# Patient Record
Sex: MALE | Race: WHITE
[De-identification: names, ages, dates, MRNs, and addresses within clinical notes are randomized per-mention and may not be internally consistent; named-entity substitution may affect disease eponyms.]

---

## 2017-04-19 ENCOUNTER — APPOINTMENT (OUTPATIENT)
Dept: CARDIOLOGY | Facility: CLINIC | Age: 47
End: 2017-04-19

## 2017-04-19 ENCOUNTER — NON-APPOINTMENT (OUTPATIENT)
Age: 47
End: 2017-04-19

## 2017-04-19 VITALS
BODY MASS INDEX: 26.79 KG/M2 | HEART RATE: 66 BPM | RESPIRATION RATE: 14 BRPM | DIASTOLIC BLOOD PRESSURE: 76 MMHG | HEIGHT: 76 IN | SYSTOLIC BLOOD PRESSURE: 134 MMHG | WEIGHT: 220 LBS

## 2017-04-19 DIAGNOSIS — Z82.49 FAMILY HISTORY OF ISCHEMIC HEART DISEASE AND OTHER DISEASES OF THE CIRCULATORY SYSTEM: ICD-10-CM

## 2017-04-19 DIAGNOSIS — Z80.42 FAMILY HISTORY OF MALIGNANT NEOPLASM OF PROSTATE: ICD-10-CM

## 2017-04-19 DIAGNOSIS — Z83.3 FAMILY HISTORY OF DIABETES MELLITUS: ICD-10-CM

## 2017-04-19 DIAGNOSIS — F41.0 PANIC DISORDER [EPISODIC PAROXYSMAL ANXIETY]: ICD-10-CM

## 2017-04-19 DIAGNOSIS — Z78.9 OTHER SPECIFIED HEALTH STATUS: ICD-10-CM

## 2017-06-13 ENCOUNTER — APPOINTMENT (OUTPATIENT)
Dept: CARDIOLOGY | Facility: CLINIC | Age: 47
End: 2017-06-13

## 2020-11-13 ENCOUNTER — APPOINTMENT (OUTPATIENT)
Dept: CARDIOLOGY | Facility: CLINIC | Age: 50
End: 2020-11-13
Payer: OTHER GOVERNMENT

## 2020-11-13 ENCOUNTER — NON-APPOINTMENT (OUTPATIENT)
Age: 50
End: 2020-11-13

## 2020-11-13 VITALS
TEMPERATURE: 98.4 F | RESPIRATION RATE: 14 BRPM | BODY MASS INDEX: 27.87 KG/M2 | HEART RATE: 86 BPM | WEIGHT: 229 LBS | OXYGEN SATURATION: 100 % | DIASTOLIC BLOOD PRESSURE: 88 MMHG | SYSTOLIC BLOOD PRESSURE: 150 MMHG

## 2020-11-13 PROCEDURE — 93000 ELECTROCARDIOGRAM COMPLETE: CPT

## 2020-11-13 PROCEDURE — 99072 ADDL SUPL MATRL&STAF TM PHE: CPT

## 2020-11-13 PROCEDURE — 99244 OFF/OP CNSLTJ NEW/EST MOD 40: CPT

## 2020-11-13 NOTE — REASON FOR VISIT
[FreeTextEntry1] : \tran Efe Flores presents today for cardiac evaluation regarding palpitations.  He has a history of HBP.  He has been told of mitral prolapse, although this was seen on an echo done here in 2017.

## 2020-11-13 NOTE — PHYSICAL EXAM
[General Appearance - Well Developed] : well developed [Normal Appearance] : normal appearance [Well Groomed] : well groomed [General Appearance - Well Nourished] : well nourished [General Appearance - In No Acute Distress] : no acute distress [Normal Conjunctiva] : the conjunctiva exhibited no abnormalities [Eyelids - No Xanthelasma] : the eyelids demonstrated no xanthelasmas [Normal Jugular Venous A Waves Present] : normal jugular venous A waves present [Normal Jugular Venous V Waves Present] : normal jugular venous V waves present [No Jugular Venous Dick A Waves] : no jugular venous dick A waves [Respiration, Rhythm And Depth] : normal respiratory rhythm and effort [Auscultation Breath Sounds / Voice Sounds] : lungs were clear to auscultation bilaterally [Heart Rate And Rhythm] : heart rate and rhythm were normal [Bowel Sounds] : normal bowel sounds [Abnormal Walk] : normal gait [Gait - Sufficient For Exercise Testing] : the gait was sufficient for exercise testing [Nail Clubbing] : no clubbing of the fingernails [Cyanosis, Localized] : no localized cyanosis [Skin Color & Pigmentation] : normal skin color and pigmentation [] : no rash [No Venous Stasis] : no venous stasis [Oriented To Time, Place, And Person] : oriented to person, place, and time [Impaired Insight] : insight and judgment were intact [Affect] : the affect was normal [Mood] : the mood was normal [FreeTextEntry1] : 2+ pulses in the upper and lower extremities to the level of the feet. No edema.

## 2020-11-13 NOTE — HISTORY OF PRESENT ILLNESS
[FreeTextEntry1] : He is 50 years old.  He treated with losartan for HBP.  He was evaluated in the ED 12  ago for a racing heart and chest discomfort. A stress isotope study was done at that time, and was unremarkable.  \par \par I last saw him in 2017.  At that time an exercise echocardiogram was performed, which was unremarkable. As he returns today, he reports a sense of racing heart, occurring in predictably stressful or anxiety provoking situations.  He is particularly concerned about his elderly mother and her wellbeing.  When stressed or anxious, he will be aware of a strong and rapid heartbeat at times.  Otherwise, he feels well.  He continues all of his usual activities without limitation. He describes no episodes of exertional chest discomfort or dyspnea. He has not suffered any syncopal events.\par \par There is no history of diabetes or hyperlipidemia. There is no family history of premature heart disease. He has never been a cigarette smoker.

## 2020-11-13 NOTE — DISCUSSION/SUMMARY
[FreeTextEntry1] : \par I reassured Mr. Flores and his wife that his exam remains unremarkable today, with reasonable blood pressure.  EKG shows sinus rhythm and is unchanged compared to his electrocardiogram that was done here 3 years ago.  At that time, stress testing with echocardiographic imaging was unremarkable.\par \par I think his symptoms are likely due to sinus tachycardia in the setting of stress and anxiety.  At present he is essentially asymptomatic.  I explained that should he experience these symptoms again, we could arrange for an event monitor as needed to record his heart rhythm at the time of symptoms, to confirm this.\par \par He tells me that he will be seeing his internist within the next couple weeks for a comprehensive exam.  He anticipates that blood work will be checked at that time, so I did not send out any blood work today.  \par \par He will continue on his current dose of losartan and I sent in a new prescription for him to his local pharmacy.  \par \par We also discussed the appropriate precautions to take in the context of the current coronavirus pandemic.\par \par He will return here on an as needed basis.

## 2021-02-23 ENCOUNTER — APPOINTMENT (OUTPATIENT)
Dept: INTERNAL MEDICINE | Facility: CLINIC | Age: 51
End: 2021-02-23
Payer: OTHER GOVERNMENT

## 2021-02-23 VITALS
BODY MASS INDEX: 28.35 KG/M2 | HEIGHT: 75 IN | TEMPERATURE: 97.34 F | WEIGHT: 228 LBS | OXYGEN SATURATION: 100 % | DIASTOLIC BLOOD PRESSURE: 74 MMHG | SYSTOLIC BLOOD PRESSURE: 130 MMHG | HEART RATE: 99 BPM

## 2021-02-23 VITALS — DIASTOLIC BLOOD PRESSURE: 76 MMHG | SYSTOLIC BLOOD PRESSURE: 128 MMHG

## 2021-02-23 DIAGNOSIS — N52.9 MALE ERECTILE DYSFUNCTION, UNSPECIFIED: ICD-10-CM

## 2021-02-23 DIAGNOSIS — Z11.59 ENCOUNTER FOR SCREENING FOR OTHER VIRAL DISEASES: ICD-10-CM

## 2021-02-23 DIAGNOSIS — Z12.5 ENCOUNTER FOR SCREENING FOR MALIGNANT NEOPLASM OF PROSTATE: ICD-10-CM

## 2021-02-23 PROCEDURE — 36415 COLL VENOUS BLD VENIPUNCTURE: CPT

## 2021-02-23 PROCEDURE — 93000 ELECTROCARDIOGRAM COMPLETE: CPT | Mod: 59

## 2021-02-23 PROCEDURE — 99386 PREV VISIT NEW AGE 40-64: CPT | Mod: 25

## 2021-02-23 PROCEDURE — G0444 DEPRESSION SCREEN ANNUAL: CPT

## 2021-02-23 PROCEDURE — 99072 ADDL SUPL MATRL&STAF TM PHE: CPT

## 2021-02-23 NOTE — PHYSICAL EXAM
[No Acute Distress] : no acute distress [Well Nourished] : well nourished [Well Developed] : well developed [Well-Appearing] : well-appearing [Normal Voice/Communication] : normal voice/communication [Normal Sclera/Conjunctiva] : normal sclera/conjunctiva [PERRL] : pupils equal round and reactive to light [Normal Outer Ear/Nose] : the outer ears and nose were normal in appearance [Normal Oropharynx] : the oropharynx was normal [No JVD] : no jugular venous distention [No Lymphadenopathy] : no lymphadenopathy [Supple] : supple [Thyroid Normal, No Nodules] : the thyroid was normal and there were no nodules present [No Respiratory Distress] : no respiratory distress  [No Accessory Muscle Use] : no accessory muscle use [Clear to Auscultation] : lungs were clear to auscultation bilaterally [Normal Rate] : normal rate  [Regular Rhythm] : with a regular rhythm [Normal S1, S2] : normal S1 and S2 [No Murmur] : no murmur heard [No Carotid Bruits] : no carotid bruits [No Abdominal Bruit] : a ~M bruit was not heard ~T in the abdomen [No Varicosities] : no varicosities [Pedal Pulses Present] : the pedal pulses are present [No Edema] : there was no peripheral edema [No Palpable Aorta] : no palpable aorta [No Extremity Clubbing/Cyanosis] : no extremity clubbing/cyanosis [Non Tender] : non-tender [Soft] : abdomen soft [Non-distended] : non-distended [No Masses] : no abdominal mass palpated [No HSM] : no HSM [Normal Bowel Sounds] : normal bowel sounds [Normal Posterior Cervical Nodes] : no posterior cervical lymphadenopathy [Normal Anterior Cervical Nodes] : no anterior cervical lymphadenopathy [No Spinal Tenderness] : no spinal tenderness [No Joint Swelling] : no joint swelling [Grossly Normal Strength/Tone] : grossly normal strength/tone [No Rash] : no rash [Coordination Grossly Intact] : coordination grossly intact [Normal Gait] : normal gait [No Focal Deficits] : no focal deficits [Normal Affect] : the affect was normal [Normal Insight/Judgement] : insight and judgment were intact [Normal TMs] : both tympanic membranes were normal [Normal Sphincter Tone] : normal sphincter tone [No Mass] : no mass [Prostate Enlargement] : the prostate was not enlarged [Prostate Tenderness] : the prostate was not tender [No Prostate Nodules] : no prostate nodules [Normal Supraclavicular Nodes] : no supraclavicular lymphadenopathy [Speech Grossly Normal] : speech grossly normal [Alert and Oriented x3] : oriented to person, place, and time [Normal Mood] : the mood was normal [FreeTextEntry1] : external hemorrhoids

## 2021-02-23 NOTE — REVIEW OF SYSTEMS
[Patient Intake Form Reviewed] : Patient intake form was reviewed [Negative] : Heme/Lymph [Dysuria] : no dysuria [Hesitancy] : no hesitancy [Nocturia] : no nocturia [Hematuria] : no hematuria [Frequency] : no frequency

## 2021-02-23 NOTE — HISTORY OF PRESENT ILLNESS
[de-identified] : 49 y/o man presents for initial visit to establish primary care w new internist. following previously w Dr Marian Alegria for primary care, had yearly checks. he feels well overall currently. has mild anxiety, stress at times. \par \par hx of moderate HTN , controlled on losartan, compliant w rx. saw Dr Lambert for CV evaluation last year, hx of mild MVP, asymptomatic. \par \par he would like to discuss ED symptoms and consider rx \par \par he had screening colonoscopy at age 48 w Dr Fuller [facility] at [facility]. pt recalls normal results

## 2021-02-23 NOTE — ASSESSMENT
[FreeTextEntry1] : discussed w pt \par \par check routine labs as below \par \par HTN well controlled on current rx, cont as prescribed\par advised on diet control, low Na intake, exercise, weight loss \par \par repeat BP improved \par reviewed cardiology evaluation, past stress testing w Dr Lambert \par \par he had influenza vaccine this season \par colonoscopy screening UTD \par \par discussed ED symptoms. no contraindications to trying sildenafil 50 mg prn to start, f/u if any concerns \par \par RTO 6 months for routine f/u or earlier prn if any new concerns

## 2021-02-23 NOTE — HEALTH RISK ASSESSMENT
[No] : In the past 12 months have you used drugs other than those required for medical reasons? No [Patient reported colonoscopy was normal] : Patient reported colonoscopy was normal [None] : None [Employed] : employed [] :  [] : No [ColonoscopyDate] : 12/19

## 2021-03-05 LAB
ALBUMIN SERPL ELPH-MCNC: 5.1 G/DL
ALP BLD-CCNC: 59 U/L
ALT SERPL-CCNC: 31 U/L
ANION GAP SERPL CALC-SCNC: 14 MMOL/L
APPEARANCE: CLEAR
AST SERPL-CCNC: 23 U/L
BASOPHILS # BLD AUTO: 0.04 K/UL
BASOPHILS NFR BLD AUTO: 0.9 %
BILIRUB SERPL-MCNC: 0.8 MG/DL
BILIRUBIN URINE: NEGATIVE
BLOOD URINE: NEGATIVE
BUN SERPL-MCNC: 13 MG/DL
CALCIUM SERPL-MCNC: 10.1 MG/DL
CHLORIDE SERPL-SCNC: 102 MMOL/L
CHOLEST SERPL-MCNC: 214 MG/DL
CO2 SERPL-SCNC: 24 MMOL/L
COLOR: YELLOW
CREAT SERPL-MCNC: 0.92 MG/DL
EOSINOPHIL # BLD AUTO: 0.06 K/UL
EOSINOPHIL NFR BLD AUTO: 1.4 %
ESTIMATED AVERAGE GLUCOSE: 105 MG/DL
GLUCOSE QUALITATIVE U: NEGATIVE
GLUCOSE SERPL-MCNC: 113 MG/DL
HBA1C MFR BLD HPLC: 5.3 %
HCT VFR BLD CALC: 44.5 %
HDLC SERPL-MCNC: 44 MG/DL
HGB BLD-MCNC: 15.8 G/DL
IMM GRANULOCYTES NFR BLD AUTO: 0.5 %
KETONES URINE: NEGATIVE
LDLC SERPL CALC-MCNC: 135 MG/DL
LEUKOCYTE ESTERASE URINE: NEGATIVE
LYMPHOCYTES # BLD AUTO: 1.52 K/UL
LYMPHOCYTES NFR BLD AUTO: 34.5 %
MAN DIFF?: NORMAL
MCHC RBC-ENTMCNC: 31.9 PG
MCHC RBC-ENTMCNC: 35.5 GM/DL
MCV RBC AUTO: 89.7 FL
MONOCYTES # BLD AUTO: 0.45 K/UL
MONOCYTES NFR BLD AUTO: 10.2 %
NEUTROPHILS # BLD AUTO: 2.31 K/UL
NEUTROPHILS NFR BLD AUTO: 52.5 %
NITRITE URINE: NEGATIVE
NONHDLC SERPL-MCNC: 170 MG/DL
PH URINE: 6
PLATELET # BLD AUTO: 191 K/UL
POTASSIUM SERPL-SCNC: 4.6 MMOL/L
PROT SERPL-MCNC: 7 G/DL
PROTEIN URINE: NORMAL
PSA SERPL-MCNC: 0.86 NG/ML
RBC # BLD: 4.96 M/UL
RBC # FLD: 12.3 %
SARS-COV-2 IGG SERPL IA-ACNC: 0.07 INDEX
SARS-COV-2 IGG SERPL QL IA: NEGATIVE
SODIUM SERPL-SCNC: 140 MMOL/L
SPECIFIC GRAVITY URINE: 1.02
T4 FREE SERPL-MCNC: 1.1 NG/DL
TRIGL SERPL-MCNC: 178 MG/DL
TSH SERPL-ACNC: 2.21 UIU/ML
UROBILINOGEN URINE: NORMAL
WBC # FLD AUTO: 4.4 K/UL

## 2021-06-22 ENCOUNTER — NON-APPOINTMENT (OUTPATIENT)
Age: 51
End: 2021-06-22

## 2021-06-22 ENCOUNTER — APPOINTMENT (OUTPATIENT)
Dept: INTERNAL MEDICINE | Facility: CLINIC | Age: 51
End: 2021-06-22
Payer: OTHER GOVERNMENT

## 2021-06-22 VITALS
DIASTOLIC BLOOD PRESSURE: 74 MMHG | HEIGHT: 75 IN | HEART RATE: 84 BPM | TEMPERATURE: 97.7 F | SYSTOLIC BLOOD PRESSURE: 140 MMHG | WEIGHT: 230 LBS | BODY MASS INDEX: 28.6 KG/M2 | OXYGEN SATURATION: 99 %

## 2021-06-22 VITALS — SYSTOLIC BLOOD PRESSURE: 130 MMHG | DIASTOLIC BLOOD PRESSURE: 80 MMHG

## 2021-06-22 DIAGNOSIS — R20.0 ANESTHESIA OF SKIN: ICD-10-CM

## 2021-06-22 DIAGNOSIS — M54.2 CERVICALGIA: ICD-10-CM

## 2021-06-22 DIAGNOSIS — R09.81 NASAL CONGESTION: ICD-10-CM

## 2021-06-22 DIAGNOSIS — R42 DIZZINESS AND GIDDINESS: ICD-10-CM

## 2021-06-22 PROCEDURE — 99214 OFFICE O/P EST MOD 30 MIN: CPT

## 2021-06-22 PROCEDURE — 99072 ADDL SUPL MATRL&STAF TM PHE: CPT

## 2021-06-22 RX ORDER — CETIRIZINE HYDROCHLORIDE 10 MG/1
10 CAPSULE, LIQUID FILLED ORAL
Qty: 90 | Refills: 3 | Status: ACTIVE | COMMUNITY
Start: 2021-06-22

## 2021-06-22 RX ORDER — SILDENAFIL 100 MG/1
100 TABLET, FILM COATED ORAL
Qty: 10 | Refills: 1 | Status: ACTIVE | COMMUNITY
Start: 2021-02-23 | End: 1900-01-01

## 2021-06-22 NOTE — HISTORY OF PRESENT ILLNESS
[FreeTextEntry8] : Mr. MIGDALIA PEOPLES is a 50 year old man with pmhx of hypertension, hx of mild MVP who presents for an acute appointment. He endorses yearly seasonal allergies. He had nasal congestion, clear secretions, and pressure in the top of his head. He has been taking tylenol and aspirin with relief of pressure. He also endorses neck pain on right posterior neck. \par \par \par He endorses a one time episode of left forearm numbness and tingling. Lasted 30 minutes. \par

## 2021-06-22 NOTE — PHYSICAL EXAM
[No Acute Distress] : no acute distress [Well Nourished] : well nourished [Normal Sclera/Conjunctiva] : normal sclera/conjunctiva [No Respiratory Distress] : no respiratory distress  [No Accessory Muscle Use] : no accessory muscle use [Pedal Pulses Present] : the pedal pulses are present [No Edema] : there was no peripheral edema [No Extremity Clubbing/Cyanosis] : no extremity clubbing/cyanosis [Soft] : abdomen soft [Non Tender] : non-tender [Normal Bowel Sounds] : normal bowel sounds

## 2021-06-28 ENCOUNTER — APPOINTMENT (OUTPATIENT)
Dept: ULTRASOUND IMAGING | Facility: CLINIC | Age: 51
End: 2021-06-28

## 2021-07-02 ENCOUNTER — APPOINTMENT (OUTPATIENT)
Dept: CARDIOLOGY | Facility: CLINIC | Age: 51
End: 2021-07-02
Payer: OTHER GOVERNMENT

## 2021-07-02 ENCOUNTER — NON-APPOINTMENT (OUTPATIENT)
Age: 51
End: 2021-07-02

## 2021-07-02 VITALS
WEIGHT: 230 LBS | OXYGEN SATURATION: 99 % | BODY MASS INDEX: 28.75 KG/M2 | HEART RATE: 90 BPM | DIASTOLIC BLOOD PRESSURE: 85 MMHG | SYSTOLIC BLOOD PRESSURE: 130 MMHG | RESPIRATION RATE: 17 BRPM

## 2021-07-02 VITALS — SYSTOLIC BLOOD PRESSURE: 114 MMHG | DIASTOLIC BLOOD PRESSURE: 68 MMHG

## 2021-07-02 DIAGNOSIS — F41.9 ANXIETY DISORDER, UNSPECIFIED: ICD-10-CM

## 2021-07-02 PROCEDURE — 99072 ADDL SUPL MATRL&STAF TM PHE: CPT

## 2021-07-02 PROCEDURE — 99215 OFFICE O/P EST HI 40 MIN: CPT

## 2021-07-02 PROCEDURE — 93000 ELECTROCARDIOGRAM COMPLETE: CPT

## 2021-07-02 NOTE — DISCUSSION/SUMMARY
[FreeTextEntry1] : \par I reassured Mr. Flores and his wife that his exam remains unremarkable today.  BP is in the lower range of normal, and given his episodic lightheadedness, I am inclined to reduce the dose of losartan to 50 mg. qd.\par \par Will provide an Rx for alprazolam to use prn in situations of stress/anxiety.  \par \par Lipid profile is higher than ideal; will next O.V., will arrange carotid Doppler to assess for presence of plaque, to help decide if he should be on a lipid lowering med.\par \par He will return in 2 mos. for and O.V. and carotid Doppler.

## 2021-07-02 NOTE — HISTORY OF PRESENT ILLNESS
[FreeTextEntry1] : He treated with losartan for HBP.  He was evaluated in the ED 1>10 yrs. ago for a racing heart and chest discomfort. A stress isotope study was done at that time, and was unremarkable.  \par \par I last saw him in November.  As he returns today, he reports occasional lightheadedness, and is concerned that this could be related to his BP.  In the early part of the day, BP is typically 120-130 systolic, but in the evening has been as low as 95 systolic.\par \par As he returns today, he is occasionally aware of a fast/string heartbeat, typically in stressful situations. He is able to continues his usual activities and reports no exertional chest discomfort or dyspnea. He has not suffered any syncopal events.\par

## 2021-07-05 LAB
CHOLEST SERPL-MCNC: 179 MG/DL
CK SERPL-CCNC: 157 U/L
HDLC SERPL-MCNC: 37 MG/DL
LDLC SERPL CALC-MCNC: 93 MG/DL
LDLC SERPL DIRECT ASSAY-MCNC: 103 MG/DL
NONHDLC SERPL-MCNC: 142 MG/DL
TRIGL SERPL-MCNC: 249 MG/DL

## 2021-09-03 ENCOUNTER — APPOINTMENT (OUTPATIENT)
Dept: CARDIOLOGY | Facility: CLINIC | Age: 51
End: 2021-09-03
Payer: OTHER GOVERNMENT

## 2021-09-03 ENCOUNTER — NON-APPOINTMENT (OUTPATIENT)
Age: 51
End: 2021-09-03

## 2021-09-03 VITALS
RESPIRATION RATE: 15 BRPM | HEIGHT: 75 IN | BODY MASS INDEX: 29.22 KG/M2 | SYSTOLIC BLOOD PRESSURE: 118 MMHG | WEIGHT: 235 LBS | OXYGEN SATURATION: 95 % | DIASTOLIC BLOOD PRESSURE: 78 MMHG | HEART RATE: 79 BPM

## 2021-09-03 PROCEDURE — 99072 ADDL SUPL MATRL&STAF TM PHE: CPT

## 2021-09-03 PROCEDURE — 93880 EXTRACRANIAL BILAT STUDY: CPT

## 2021-09-03 PROCEDURE — 99214 OFFICE O/P EST MOD 30 MIN: CPT

## 2021-09-03 PROCEDURE — 93000 ELECTROCARDIOGRAM COMPLETE: CPT

## 2021-09-03 NOTE — DISCUSSION/SUMMARY
[FreeTextEntry1] : \par I reassured Mr. Flores that his exam & EKG remain stable today; BP is in normal range.  He will continue losartan at 50 mg. qd.\par \par Reviewed carotid Doppler.  As study is unremarkable, see no reason to add med for lipid control.  He will continue a low fat diet and try to lose weight. \par \par He will return in 6 mos. for a f/u visit.

## 2021-09-03 NOTE — HISTORY OF PRESENT ILLNESS
[FreeTextEntry1] : He treated with losartan for HBP.  He was evaluated in the ED in the past for a racing heart and chest discomfort. A stress isotope study was done at that time, and was unremarkable.  \par \par I last saw him in July.  At that time, he reported occasional lightheadedness, and was concerned that this could be related to his BP.  The dose of losartan was reduced as BP control appeared to be too strict.\par \par As he returns today, he has been feeling well.  He reports no recent palps.  He continues his usual activities and there have been no episodes of exertional chest discomfort or TAFOYA. He reports no lightheadedness or episodes orthopnea or PND.\par \par There have been no new interval medical problems.

## 2021-12-30 ENCOUNTER — RX RENEWAL (OUTPATIENT)
Age: 51
End: 2021-12-30

## 2022-03-04 ENCOUNTER — APPOINTMENT (OUTPATIENT)
Dept: CARDIOLOGY | Facility: CLINIC | Age: 52
End: 2022-03-04

## 2022-03-04 NOTE — PHYSICAL EXAM
[General Appearance - Well Developed] : well developed [Well Groomed] : well groomed [Normal Appearance] : normal appearance [General Appearance - Well Nourished] : well nourished [General Appearance - In No Acute Distress] : no acute distress [Normal Conjunctiva] : the conjunctiva exhibited no abnormalities [Eyelids - No Xanthelasma] : the eyelids demonstrated no xanthelasmas [Normal Jugular Venous A Waves Present] : normal jugular venous A waves present [Normal Jugular Venous V Waves Present] : normal jugular venous V waves present [No Jugular Venous Dick A Waves] : no jugular venous dick A waves [Respiration, Rhythm And Depth] : normal respiratory rhythm and effort [Heart Rate And Rhythm] : heart rate and rhythm were normal [Auscultation Breath Sounds / Voice Sounds] : lungs were clear to auscultation bilaterally [Bowel Sounds] : normal bowel sounds [Abnormal Walk] : normal gait [Gait - Sufficient For Exercise Testing] : the gait was sufficient for exercise testing [Nail Clubbing] : no clubbing of the fingernails [Cyanosis, Localized] : no localized cyanosis [Skin Color & Pigmentation] : normal skin color and pigmentation [] : no rash [No Venous Stasis] : no venous stasis [Impaired Insight] : insight and judgment were intact [Oriented To Time, Place, And Person] : oriented to person, place, and time [Affect] : the affect was normal [Mood] : the mood was normal [FreeTextEntry1] : 2+ pulses in the upper and lower extremities to the level of the feet. No edema.

## 2022-03-04 NOTE — DISCUSSION/SUMMARY
[FreeTextEntry1] : I reassured Mr. Flores that his exam & EKG remain stable today; BP is in normal range.  He will continue losartan at 50 mg. qd.\par \par Reviewed carotid Doppler.  As study is unremarkable, see no reason to add med for lipid control.  He will continue a low fat diet and try to lose weight. \par \par minutes spent on today's office visit. \par \par He will return in 6 mos. for a f/u visit.

## 2022-03-04 NOTE — REASON FOR VISIT
[FreeTextEntry1] : NO SHOW FOR APPOINTMENT TODAY.\par \par PRELIMINARY NOTE\par \par Efe Flores returns for f/u re palpitations and HBP.

## 2022-04-11 PROBLEM — Z11.59 SCREENING FOR VIRAL DISEASE: Status: ACTIVE | Noted: 2021-02-23

## 2022-06-10 ENCOUNTER — APPOINTMENT (OUTPATIENT)
Dept: CARDIOLOGY | Facility: CLINIC | Age: 52
End: 2022-06-10
Payer: OTHER GOVERNMENT

## 2022-06-10 ENCOUNTER — NON-APPOINTMENT (OUTPATIENT)
Age: 52
End: 2022-06-10

## 2022-06-10 VITALS — SYSTOLIC BLOOD PRESSURE: 118 MMHG | DIASTOLIC BLOOD PRESSURE: 74 MMHG

## 2022-06-10 VITALS
OXYGEN SATURATION: 95 % | HEART RATE: 86 BPM | HEIGHT: 75 IN | SYSTOLIC BLOOD PRESSURE: 124 MMHG | WEIGHT: 234 LBS | BODY MASS INDEX: 29.09 KG/M2 | DIASTOLIC BLOOD PRESSURE: 80 MMHG

## 2022-06-10 PROCEDURE — 99214 OFFICE O/P EST MOD 30 MIN: CPT

## 2022-06-10 PROCEDURE — 99072 ADDL SUPL MATRL&STAF TM PHE: CPT

## 2022-06-10 PROCEDURE — 93000 ELECTROCARDIOGRAM COMPLETE: CPT

## 2022-06-10 NOTE — HISTORY OF PRESENT ILLNESS
[FreeTextEntry1] : He treated with losartan for HBP.  He was evaluated in the ED in the past for a racing heart and chest discomfort. A stress isotope study was done at that time, and was unremarkable.  \par \par Since I last saw him, he has been well.  He continues his usual work and activities.  He describes no recurrence of palps or lightheadedness.  There have been no episodes of exertional chest discomfort or TAFOYA. He reports no episodes of orthopnea or PND.\par \par There have been no new interval medical problems.

## 2022-06-10 NOTE — DISCUSSION/SUMMARY
[FreeTextEntry1] : I reassured Mr. Flores that his exam & EKG remain stable today.  BP remains in normal range on the lesser dose of losartan; he will continue 50 mg. qd.\par \par Mild HLD - Continue a low fat diet and efforts to lose weight. \par \par 30 minutes spent on today's office visit. \par \par He will return in 6 mos. for a f/u visit.

## 2022-06-13 LAB
ALBUMIN SERPL ELPH-MCNC: 5.1 G/DL
ALP BLD-CCNC: 66 U/L
ALT SERPL-CCNC: 29 U/L
ANION GAP SERPL CALC-SCNC: 14 MMOL/L
AST SERPL-CCNC: 21 U/L
BASOPHILS # BLD AUTO: 0.06 K/UL
BASOPHILS NFR BLD AUTO: 0.9 %
BILIRUB SERPL-MCNC: 0.6 MG/DL
BUN SERPL-MCNC: 11 MG/DL
CALCIUM SERPL-MCNC: 9.8 MG/DL
CHLORIDE SERPL-SCNC: 101 MMOL/L
CHOLEST SERPL-MCNC: 204 MG/DL
CO2 SERPL-SCNC: 25 MMOL/L
CREAT SERPL-MCNC: 0.81 MG/DL
EGFR: 105 ML/MIN/1.73M2
EOSINOPHIL # BLD AUTO: 0.11 K/UL
EOSINOPHIL NFR BLD AUTO: 1.6 %
ESTIMATED AVERAGE GLUCOSE: 108 MG/DL
GLUCOSE SERPL-MCNC: 102 MG/DL
HBA1C MFR BLD HPLC: 5.4 %
HCT VFR BLD CALC: 43.9 %
HDLC SERPL-MCNC: 41 MG/DL
HGB BLD-MCNC: 16 G/DL
IMM GRANULOCYTES NFR BLD AUTO: 0.3 %
LDLC SERPL CALC-MCNC: 110 MG/DL
LDLC SERPL DIRECT ASSAY-MCNC: 125 MG/DL
LYMPHOCYTES # BLD AUTO: 1.43 K/UL
LYMPHOCYTES NFR BLD AUTO: 21.4 %
MAN DIFF?: NORMAL
MCHC RBC-ENTMCNC: 32.5 PG
MCHC RBC-ENTMCNC: 36.4 GM/DL
MCV RBC AUTO: 89 FL
MONOCYTES # BLD AUTO: 0.59 K/UL
MONOCYTES NFR BLD AUTO: 8.8 %
NEUTROPHILS # BLD AUTO: 4.48 K/UL
NEUTROPHILS NFR BLD AUTO: 67 %
NONHDLC SERPL-MCNC: 164 MG/DL
PLATELET # BLD AUTO: 191 K/UL
POTASSIUM SERPL-SCNC: 4.2 MMOL/L
PROT SERPL-MCNC: 7.2 G/DL
RBC # BLD: 4.93 M/UL
RBC # FLD: 12.4 %
SODIUM SERPL-SCNC: 141 MMOL/L
TRIGL SERPL-MCNC: 270 MG/DL
WBC # FLD AUTO: 6.69 K/UL

## 2022-07-09 ENCOUNTER — TRANSCRIPTION ENCOUNTER (OUTPATIENT)
Age: 52
End: 2022-07-09

## 2022-07-09 ENCOUNTER — NON-APPOINTMENT (OUTPATIENT)
Age: 52
End: 2022-07-09

## 2022-09-14 ENCOUNTER — APPOINTMENT (OUTPATIENT)
Dept: CARDIOLOGY | Facility: CLINIC | Age: 52
End: 2022-09-14

## 2022-09-14 ENCOUNTER — NON-APPOINTMENT (OUTPATIENT)
Age: 52
End: 2022-09-14

## 2022-09-14 VITALS
OXYGEN SATURATION: 97 % | BODY MASS INDEX: 28.12 KG/M2 | SYSTOLIC BLOOD PRESSURE: 118 MMHG | WEIGHT: 225 LBS | DIASTOLIC BLOOD PRESSURE: 82 MMHG | HEART RATE: 90 BPM

## 2022-09-14 PROCEDURE — 99072 ADDL SUPL MATRL&STAF TM PHE: CPT

## 2022-09-14 PROCEDURE — 93000 ELECTROCARDIOGRAM COMPLETE: CPT

## 2022-09-14 PROCEDURE — 99215 OFFICE O/P EST HI 40 MIN: CPT

## 2022-09-14 NOTE — HISTORY OF PRESENT ILLNESS
[FreeTextEntry1] : He is treated with losartan for HBP.  He was evaluated in the ED in the past for a racing heart and chest discomfort. A stress isotope study was done at that time, and was unremarkable.  \par \par As he returns today, he describes increased stress of late.  His mother was recently hospitalized for an acute myocardial infarction and required placements of stents into arteries.  He also with, with his wife, is in the process of selling his mother-in-law's home, which has been a complex process.  In this context, he acknowledges degree of anxiety.  Over the past week or 2, he has been more aware of his heart rate, with the periodic sense of palpitations, but no exertional chest discomfort or dyspnea.  He is able to continue his usual activities without limitation.  He describes occasional mild lightheadedness but no syncopal events.  There have been no episodes of orthopnea or PND.\par

## 2022-09-14 NOTE — DISCUSSION/SUMMARY
[EKG obtained to assist in diagnosis and management of assessed problem(s)] : EKG obtained to assist in diagnosis and management of assessed problem(s) [FreeTextEntry1] : Palpitations  - I reassured Mr. Flores that his EKG is within normal limits today.  His physical exam is also unremarkable, with normal heart sounds and blood pressure in good range.  I suspect some of the symptoms are related to anxiety.  I will have him wear a ZIO XT monitor for 1 week, to assess his ambulatory rhythm.  We will arrange for an echocardiogram at his next office visit to assess underlying cardiac structure and function.\par \par HBP -remains in good range today.  He will continue losartan 50 mg. qd.\par \par Mild HLD - Continue a low fat diet and efforts at weight loss. \par \par 40 minutes spent on today's office visit. \par \par He will return in 3-4 weeks for a f/u visit with TTE.

## 2022-09-30 ENCOUNTER — NON-APPOINTMENT (OUTPATIENT)
Age: 52
End: 2022-09-30

## 2022-09-30 ENCOUNTER — APPOINTMENT (OUTPATIENT)
Dept: CARDIOLOGY | Facility: CLINIC | Age: 52
End: 2022-09-30

## 2022-09-30 VITALS
BODY MASS INDEX: 27.98 KG/M2 | SYSTOLIC BLOOD PRESSURE: 156 MMHG | RESPIRATION RATE: 15 BRPM | HEART RATE: 106 BPM | WEIGHT: 225 LBS | HEIGHT: 75 IN | DIASTOLIC BLOOD PRESSURE: 83 MMHG | OXYGEN SATURATION: 97 %

## 2022-09-30 PROCEDURE — 99072 ADDL SUPL MATRL&STAF TM PHE: CPT

## 2022-09-30 PROCEDURE — 93000 ELECTROCARDIOGRAM COMPLETE: CPT | Mod: 59

## 2022-09-30 PROCEDURE — 99214 OFFICE O/P EST MOD 30 MIN: CPT

## 2022-09-30 RX ORDER — ALPRAZOLAM 0.25 MG/1
0.25 TABLET ORAL
Qty: 40 | Refills: 0 | Status: ACTIVE | COMMUNITY
Start: 2021-07-02 | End: 1900-01-01

## 2022-09-30 NOTE — DISCUSSION/SUMMARY
[FreeTextEntry1] : Palpitations  - I again reassured Mr. Flores that, other than mild sinus tachycardia, his EKG remains unremarkable today as does his physical exam.\par We also reviewed the recent ZIO XT monitor.  It showed sinus rhythm with rates between 39 and 146 bpm, and an average rate of 65.  Isolated APCs and VPCs were seen but no complex forms; the ectopy was described as rare.  Symptoms occurred in the context of sinus rhythm, PVCs, and VPCs. \par \par He will keep his appointment in the next week or two as scheduled for an echocardiogram to assess underlying cardiac structure and function.\par \par HBP - remains in good range today.  He will continue losartan 50 mg. qd.\par \par Mild HLD -he remains on a low-fat diet and continues his efforts at weight loss.\par \par As in the past, I suggested that he would benefit from seeing a psychiatrist for at least a psychologist.  I had given him names previously and he is in the process of trying to find someone. \par \par I have renewed his prescription for Xanax, which he uses as needed for anxiety/stress.\par \par 32 minutes spent on today's office visit.  These matters were also discussed with the patient's wife by phone.\par \par He will return as planned for the f/u visit with TTE.  [EKG obtained to assist in diagnosis and management of assessed problem(s)] : EKG obtained to assist in diagnosis and management of assessed problem(s)

## 2022-09-30 NOTE — HISTORY OF PRESENT ILLNESS
comfortable appearance/cooperative comfortable appearance/cooperative comfortable appearance/cooperative/no change observed/resting/sleeping [FreeTextEntry1] : He is treated with losartan for HBP.  He was evaluated in the ED in 2008 for a racing heart and chest discomfort. A stress isotope study was done at that time, and was unremarkable.  \par \par As he returns today, he remains under increased stress of late related to his mother's recent cardiac health issues.  He is also helping his wife sell his late mother-in-law's home.  Work is also stressful at times.  Because of these issues, he remains anxious at times.  As I see him today, he is again aware of an increased heart rate at times, occasionally with isolated palpitations.  There have been no episodes of exertional chest discomfort or dyspnea.  He continues his usual activities without limitation.  He reports occasional mild lightheadedness but no syncopal events.  There have been no episodes of orthopnea or PND.\par \par  comfortable appearance/cooperative

## 2022-10-17 ENCOUNTER — APPOINTMENT (OUTPATIENT)
Dept: CARDIOLOGY | Facility: CLINIC | Age: 52
End: 2022-10-17

## 2022-10-17 ENCOUNTER — NON-APPOINTMENT (OUTPATIENT)
Age: 52
End: 2022-10-17

## 2022-10-17 VITALS
SYSTOLIC BLOOD PRESSURE: 120 MMHG | HEIGHT: 75 IN | WEIGHT: 226 LBS | OXYGEN SATURATION: 98 % | DIASTOLIC BLOOD PRESSURE: 70 MMHG | HEART RATE: 73 BPM | BODY MASS INDEX: 28.1 KG/M2

## 2022-10-17 PROCEDURE — 93306 TTE W/DOPPLER COMPLETE: CPT

## 2022-10-17 PROCEDURE — 93000 ELECTROCARDIOGRAM COMPLETE: CPT

## 2022-10-17 PROCEDURE — 99072 ADDL SUPL MATRL&STAF TM PHE: CPT

## 2022-10-17 PROCEDURE — 99214 OFFICE O/P EST MOD 30 MIN: CPT

## 2022-10-17 NOTE — HISTORY OF PRESENT ILLNESS
[FreeTextEntry1] : He is treated with losartan for HBP.  He was evaluated in the ED in 2008 for a racing heart and chest discomfort. A stress isotope study was done at that time, and was unremarkable.  \par \par As he returns today, he has been feeling better.  He reports no recent palpitations.  He has not been aware of his pulse rate.  He describes no episodes of exertional chest discomfort or dyspnea.  He is able to continue his usual activities without problem.  There have been no episodes of lightheadedness or syncope.  He reports no episodes of orthopnea or PND.\par \par

## 2022-10-17 NOTE — PHYSICAL EXAM
[Normal Appearance] : normal appearance [General Appearance - Well Developed] : well developed [Well Groomed] : well groomed [General Appearance - Well Nourished] : well nourished [General Appearance - In No Acute Distress] : no acute distress [Normal Conjunctiva] : the conjunctiva exhibited no abnormalities [Eyelids - No Xanthelasma] : the eyelids demonstrated no xanthelasmas [Normal Jugular Venous A Waves Present] : normal jugular venous A waves present [Normal Jugular Venous V Waves Present] : normal jugular venous V waves present [No Jugular Venous Dick A Waves] : no jugular venous dick A waves [Respiration, Rhythm And Depth] : normal respiratory rhythm and effort [Auscultation Breath Sounds / Voice Sounds] : lungs were clear to auscultation bilaterally [Heart Rate And Rhythm] : heart rate and rhythm were normal [Bowel Sounds] : normal bowel sounds [Abnormal Walk] : normal gait [Gait - Sufficient For Exercise Testing] : the gait was sufficient for exercise testing [Cyanosis, Localized] : no localized cyanosis [Nail Clubbing] : no clubbing of the fingernails [Skin Color & Pigmentation] : normal skin color and pigmentation [] : no rash [No Venous Stasis] : no venous stasis [Oriented To Time, Place, And Person] : oriented to person, place, and time [Impaired Insight] : insight and judgment were intact [Affect] : the affect was normal [Mood] : the mood was normal [FreeTextEntry1] : 2+ pulses in the upper and lower extremities to the level of the feet. No edema.

## 2022-10-17 NOTE — DISCUSSION/SUMMARY
[FreeTextEntry1] : Palpitations  - I again reassured Mr. Flores that EKG appears normal today and his physical exam remains normal as well.  Echocardiography today demonstrates good cardiac function with no significant abnormality; when compared to the echocardiogram done in 2017, there has been no interval change. \par \par HBP -in normal range today; to continue losartan 50 mg. qd.\par \par Mild HLD -continue low fat diet and efforts at weight loss.\par \par He has been seeing Dr. Randolph for help from a psychiatric standpoint.  He has found this to be helpful. \par \par 31 minutes spent on today's office visit.  \par \par He will return for an O.V. in 6 mos. [EKG obtained to assist in diagnosis and management of assessed problem(s)] : EKG obtained to assist in diagnosis and management of assessed problem(s)

## 2023-01-06 ENCOUNTER — APPOINTMENT (OUTPATIENT)
Dept: CARDIOLOGY | Facility: CLINIC | Age: 53
End: 2023-01-06

## 2023-01-06 NOTE — REASON FOR VISIT
[FreeTextEntry1] : NO SHOW FOR APPOINTMENT TODAY. \par \par PRELIMINARY NOTE\par \par Efe Flores returns for f/u re palpitations and HBP.

## 2023-01-06 NOTE — HISTORY OF PRESENT ILLNESS
[FreeTextEntry1] : He is treated with losartan for HBP.  He was evaluated in the ED in 2008 for a racing heart and chest discomfort. A stress isotope study was done at that time, and was unremarkable.  \par \par As he returns today, \par \par \par he has been feeling better.  He reports no recent palpitations.  He has not been aware of his pulse rate.  He describes no episodes of exertional chest discomfort or dyspnea.  He is able to continue his usual activities without problem.  There have been no episodes of lightheadedness or syncope.  He reports no episodes of orthopnea or PND.\par \par

## 2023-03-07 ENCOUNTER — RX RENEWAL (OUTPATIENT)
Age: 53
End: 2023-03-07

## 2023-03-25 NOTE — DISCUSSION/SUMMARY
PAST SURGICAL HISTORY:  S/P  shunt      [FreeTextEntry1] : Palpitations  - I again reassured Mr. Flores that EKG appears normal today and his physical exam remains normal as well.  Echocardiography today demonstrates good cardiac function with no significant abnormality; when compared to the echocardiogram done in 2017, there has been no interval change. \par \par HBP -in normal range today; to continue losartan 50 mg. qd.\par \par Mild HLD -continue low fat diet and efforts at weight loss.\par \par He has been seeing Dr. Randolph for help from a psychiatric standpoint.  He has found this to be helpful. \par \par 31 minutes spent on today's office visit.  \par \par He will return for an O.V. in 6 mos.

## 2023-04-24 ENCOUNTER — NON-APPOINTMENT (OUTPATIENT)
Age: 53
End: 2023-04-24

## 2023-04-24 ENCOUNTER — APPOINTMENT (OUTPATIENT)
Dept: CARDIOLOGY | Facility: CLINIC | Age: 53
End: 2023-04-24
Payer: OTHER GOVERNMENT

## 2023-04-24 VITALS
HEIGHT: 78 IN | HEART RATE: 76 BPM | SYSTOLIC BLOOD PRESSURE: 126 MMHG | BODY MASS INDEX: 26.61 KG/M2 | WEIGHT: 230 LBS | DIASTOLIC BLOOD PRESSURE: 70 MMHG | OXYGEN SATURATION: 100 %

## 2023-04-24 PROCEDURE — 99072 ADDL SUPL MATRL&STAF TM PHE: CPT

## 2023-04-24 PROCEDURE — 93000 ELECTROCARDIOGRAM COMPLETE: CPT

## 2023-04-24 PROCEDURE — 99214 OFFICE O/P EST MOD 30 MIN: CPT

## 2023-04-24 NOTE — HISTORY OF PRESENT ILLNESS
[FreeTextEntry1] : He is treated with losartan for HBP.  He was evaluated in the ED in 2008 for a racing heart and chest discomfort. A stress isotope study was done at that time, and was unremarkable.  \par \par As he returns today, he remains active and continues his usual activities and work.  There has been no recurrence of palpitations.  He describes no episodes of exertional chest discomfort or dyspnea.  He reports occasional vague episodes of precordial/epigastric discomfort in stressful circumstances, but not otherwise.  have been no episodes of lightheadedness or syncope.  He reports no episodes of orthopnea or PND.\par \par There have been no new interval medical problems. Medications are unchanged.

## 2023-04-24 NOTE — DISCUSSION/SUMMARY
[EKG obtained to assist in diagnosis and management of assessed problem(s)] : EKG obtained to assist in diagnosis and management of assessed problem(s) [FreeTextEntry1] : Palpitations  - EKG remains unremarkable.  Recent echo showed good cardiac function.  No significant sxs. recently.  \par \par HBP - BP in normal range today.  Continue losartan 50 mg. qd.\par \par Mild HLD -continue low fat diet and efforts at weight loss.  Will recheck labs today.\par \par Given his vague chest discomfort, will have him return at his convenience for an Ex-Echo.\par \par 32 minutes spent on today's office visit.  \par \par If stress test is unremarkable, he will return for an O.V. in 6 mos.

## 2023-04-26 LAB
ALBUMIN SERPL ELPH-MCNC: 4.9 G/DL
ALP BLD-CCNC: 60 U/L
ALT SERPL-CCNC: 22 U/L
AST SERPL-CCNC: 19 U/L
BILIRUB DIRECT SERPL-MCNC: 0.2 MG/DL
BILIRUB INDIRECT SERPL-MCNC: 0.8 MG/DL
BILIRUB SERPL-MCNC: 1.1 MG/DL
CHOLEST SERPL-MCNC: 229 MG/DL
HDLC SERPL-MCNC: 50 MG/DL
LDLC SERPL CALC-MCNC: 152 MG/DL
LDLC SERPL DIRECT ASSAY-MCNC: 152 MG/DL
NONHDLC SERPL-MCNC: 179 MG/DL
PROT SERPL-MCNC: 6.9 G/DL
TRIGL SERPL-MCNC: 139 MG/DL

## 2023-07-21 ENCOUNTER — APPOINTMENT (OUTPATIENT)
Dept: CARDIOLOGY | Facility: CLINIC | Age: 53
End: 2023-07-21
Payer: OTHER GOVERNMENT

## 2023-07-21 PROCEDURE — 93351 STRESS TTE COMPLETE: CPT

## 2023-07-21 NOTE — DISCUSSION/SUMMARY
[FreeTextEntry1] : Palpitations  - EKG remains unremarkable.  Recent echo showed good cardiac function.  No significant sxs. recently.  \par \par HBP - BP in normal range today.  Continue losartan 50 mg. qd.\par \par Mild HLD -continue low fat diet and efforts at weight loss.  Will recheck labs today.\par \par Given his vague chest discomfort, will have him return at his convenience for an Ex-Echo.\par \par 32 minutes spent on today's office visit.  \par \par If stress test is unremarkable, he will return for an O.V. in 6 mos.

## 2023-07-21 NOTE — REASON FOR VISIT
[FreeTextEntry1] : Stress test only today; no O.V.\par \par PRELIMINARY NOTE\par \par Efe Flores returns for f/u re palpitations and HBP.

## 2023-07-21 NOTE — PHYSICAL EXAM
[General Appearance - Well Developed] : well developed [Normal Appearance] : normal appearance [Well Groomed] : well groomed [General Appearance - In No Acute Distress] : no acute distress [General Appearance - Well Nourished] : well nourished [Normal Conjunctiva] : the conjunctiva exhibited no abnormalities [Eyelids - No Xanthelasma] : the eyelids demonstrated no xanthelasmas [Normal Jugular Venous A Waves Present] : normal jugular venous A waves present [Normal Jugular Venous V Waves Present] : normal jugular venous V waves present [No Jugular Venous Dick A Waves] : no jugular venous dick A waves [Respiration, Rhythm And Depth] : normal respiratory rhythm and effort [Auscultation Breath Sounds / Voice Sounds] : lungs were clear to auscultation bilaterally [Heart Rate And Rhythm] : heart rate and rhythm were normal [Bowel Sounds] : normal bowel sounds [Abnormal Walk] : normal gait [Gait - Sufficient For Exercise Testing] : the gait was sufficient for exercise testing [Nail Clubbing] : no clubbing of the fingernails [Cyanosis, Localized] : no localized cyanosis [Skin Color & Pigmentation] : normal skin color and pigmentation [] : no rash [No Venous Stasis] : no venous stasis [Oriented To Time, Place, And Person] : oriented to person, place, and time [Impaired Insight] : insight and judgment were intact [Affect] : the affect was normal [Mood] : the mood was normal [FreeTextEntry1] : 2+ pulses in the upper and lower extremities to the level of the feet. No edema.

## 2023-07-21 NOTE — HISTORY OF PRESENT ILLNESS
[FreeTextEntry1] : He is treated with losartan for HBP.  He was evaluated in the ED in 2008 for a racing heart and chest discomfort. A stress isotope study was done at that time, and was unremarkable.  \par \par As he returns today, \par \par he remains active and continues his usual activities and work.  There has been no recurrence of palpitations.  He describes no episodes of exertional chest discomfort or dyspnea.  He reports occasional vague episodes of precordial/epigastric discomfort in stressful circumstances, but not otherwise.  have been no episodes of lightheadedness or syncope.  He reports no episodes of orthopnea or PND.\par \par There have been no new interval medical problems. Medications are unchanged.

## 2023-07-28 NOTE — HISTORY OF PRESENT ILLNESS
Addended by: RICHARD FELTON on: 7/28/2023 12:27 PM     Modules accepted: Orders    
[FreeTextEntry1] : He treated with losartan for HBP.  He was evaluated in the ED in the past for a racing heart and chest discomfort. A stress isotope study was done at that time, and was unremarkable.  \par \par Since I last saw him,\par \par \par \par he reported occasional lightheadedness, and was concerned that this could be related to his BP.  The dose of losartan was reduced as BP control appeared to be too strict.\par \par As he returns today, he has been feeling well.  He reports no recent palps.  He continues his usual activities and there have been no episodes of exertional chest discomfort or TAFOYA. He reports no lightheadedness or episodes orthopnea or PND.\par \par There have been no new interval medical problems.

## 2023-09-06 ENCOUNTER — APPOINTMENT (OUTPATIENT)
Dept: INTERNAL MEDICINE | Facility: CLINIC | Age: 53
End: 2023-09-06
Payer: OTHER GOVERNMENT

## 2023-09-06 VITALS
TEMPERATURE: 206.96 F | DIASTOLIC BLOOD PRESSURE: 89 MMHG | BODY MASS INDEX: 28.37 KG/M2 | HEART RATE: 86 BPM | SYSTOLIC BLOOD PRESSURE: 140 MMHG | WEIGHT: 233 LBS | OXYGEN SATURATION: 97 % | HEIGHT: 76 IN

## 2023-09-06 DIAGNOSIS — E66.3 OVERWEIGHT: ICD-10-CM

## 2023-09-06 DIAGNOSIS — Z00.00 ENCOUNTER FOR GENERAL ADULT MEDICAL EXAMINATION W/OUT ABNORMAL FINDINGS: ICD-10-CM

## 2023-09-06 PROCEDURE — 36415 COLL VENOUS BLD VENIPUNCTURE: CPT

## 2023-09-06 PROCEDURE — 99396 PREV VISIT EST AGE 40-64: CPT | Mod: 25

## 2023-09-06 RX ORDER — CYCLOBENZAPRINE HYDROCHLORIDE 10 MG/1
10 TABLET, FILM COATED ORAL
Qty: 15 | Refills: 2 | Status: DISCONTINUED | COMMUNITY
Start: 2021-06-22 | End: 2023-09-06

## 2023-09-13 LAB
ALBUMIN SERPL ELPH-MCNC: 5.3 G/DL
ALP BLD-CCNC: 63 U/L
ALT SERPL-CCNC: 29 U/L
ANION GAP SERPL CALC-SCNC: 15 MMOL/L
APPEARANCE: CLEAR
AST SERPL-CCNC: 24 U/L
BACTERIA: NEGATIVE /HPF
BASOPHILS # BLD AUTO: 0.05 K/UL
BASOPHILS NFR BLD AUTO: 0.8 %
BILIRUB SERPL-MCNC: 0.9 MG/DL
BILIRUBIN URINE: NEGATIVE
BLOOD URINE: NEGATIVE
BUN SERPL-MCNC: 12 MG/DL
CALCIUM SERPL-MCNC: 9.9 MG/DL
CAST: 0 /LPF
CHLORIDE SERPL-SCNC: 99 MMOL/L
CO2 SERPL-SCNC: 25 MMOL/L
COLOR: YELLOW
CREAT SERPL-MCNC: 0.85 MG/DL
EGFR: 103 ML/MIN/1.73M2
EOSINOPHIL # BLD AUTO: 0.09 K/UL
EOSINOPHIL NFR BLD AUTO: 1.4 %
EPITHELIAL CELLS: 0 /HPF
ESTIMATED AVERAGE GLUCOSE: 111 MG/DL
GLUCOSE QUALITATIVE U: NEGATIVE MG/DL
GLUCOSE SERPL-MCNC: 95 MG/DL
HBA1C MFR BLD HPLC: 5.5 %
HCT VFR BLD CALC: 49.9 %
HGB BLD-MCNC: 16.8 G/DL
IMM GRANULOCYTES NFR BLD AUTO: 0.2 %
KETONES URINE: NEGATIVE MG/DL
LEUKOCYTE ESTERASE URINE: NEGATIVE
LYMPHOCYTES # BLD AUTO: 2.34 K/UL
LYMPHOCYTES NFR BLD AUTO: 36.9 %
MAN DIFF?: NORMAL
MCHC RBC-ENTMCNC: 31.4 PG
MCHC RBC-ENTMCNC: 33.7 GM/DL
MCV RBC AUTO: 93.3 FL
MICROSCOPIC-UA: NORMAL
MONOCYTES # BLD AUTO: 0.54 K/UL
MONOCYTES NFR BLD AUTO: 8.5 %
NEUTROPHILS # BLD AUTO: 3.31 K/UL
NEUTROPHILS NFR BLD AUTO: 52.2 %
NITRITE URINE: NEGATIVE
PH URINE: 6
PLATELET # BLD AUTO: 190 K/UL
POTASSIUM SERPL-SCNC: 4 MMOL/L
PROT SERPL-MCNC: 7.1 G/DL
PROTEIN URINE: NEGATIVE MG/DL
PSA SERPL-MCNC: 0.76 NG/ML
RBC # BLD: 5.35 M/UL
RBC # FLD: 12.9 %
RED BLOOD CELLS URINE: 0 /HPF
SODIUM SERPL-SCNC: 140 MMOL/L
SPECIFIC GRAVITY URINE: 1.01
T4 FREE SERPL-MCNC: 1.1 NG/DL
TSH SERPL-ACNC: 2.74 UIU/ML
UROBILINOGEN URINE: 0.2 MG/DL
WBC # FLD AUTO: 6.34 K/UL
WHITE BLOOD CELLS URINE: 0 /HPF

## 2023-10-16 ENCOUNTER — APPOINTMENT (OUTPATIENT)
Dept: CARDIOLOGY | Facility: CLINIC | Age: 53
End: 2023-10-16

## 2023-11-21 ENCOUNTER — RX RENEWAL (OUTPATIENT)
Age: 53
End: 2023-11-21

## 2023-11-21 RX ORDER — LOSARTAN POTASSIUM 50 MG/1
50 TABLET, FILM COATED ORAL
Qty: 90 | Refills: 3 | Status: ACTIVE | COMMUNITY
Start: 2021-12-30 | End: 1900-01-01

## 2023-12-13 ENCOUNTER — APPOINTMENT (OUTPATIENT)
Dept: CARDIOLOGY | Facility: CLINIC | Age: 53
End: 2023-12-13
Payer: OTHER GOVERNMENT

## 2023-12-13 ENCOUNTER — NON-APPOINTMENT (OUTPATIENT)
Age: 53
End: 2023-12-13

## 2023-12-13 VITALS
RESPIRATION RATE: 14 BRPM | WEIGHT: 230 LBS | SYSTOLIC BLOOD PRESSURE: 140 MMHG | HEIGHT: 76 IN | OXYGEN SATURATION: 98 % | HEART RATE: 81 BPM | DIASTOLIC BLOOD PRESSURE: 80 MMHG | BODY MASS INDEX: 28.01 KG/M2

## 2023-12-13 PROCEDURE — 36415 COLL VENOUS BLD VENIPUNCTURE: CPT

## 2023-12-13 PROCEDURE — 93000 ELECTROCARDIOGRAM COMPLETE: CPT

## 2023-12-13 PROCEDURE — 99214 OFFICE O/P EST MOD 30 MIN: CPT

## 2023-12-13 NOTE — DISCUSSION/SUMMARY
[FreeTextEntry1] : EKG today shows:  Sinus Rhythm at 83 bpm.  Mild IVCD, otherwise normal tracing; no significant change.     PLAN: 1.  Palpitations  - EKG remains unremarkable.  Recent echo showed good cardiac function.  Sxs. do not require additional treatment at this time.  2. HBP - BP in good range today. - continue losartan 50 mg. qd.  3.  HLD - - will recheck labs; will consider adding statin.  4. Atypical CP - unremarkable Ex-Echo in 7/2023.  Will call when labs results available; will likely start Lipitor at 10 mg. qd  32 minutes spent on today's office visit.    Return for an O.V. in 6 mo. [EKG obtained to assist in diagnosis and management of assessed problem(s)] : EKG obtained to assist in diagnosis and management of assessed problem(s)

## 2023-12-13 NOTE — HISTORY OF PRESENT ILLNESS
[FreeTextEntry1] : He is treated with losartan for HBP.  He was evaluated in the ED in 2008 for a racing heart and chest discomfort. A stress isotope study was done at that time, and was unremarkable.    As he returns today, he continues his usual activities and work and has been well overall.  He describes no episodes of exertional chest discomfort or dyspnea.  He reports occasional palps., usually in stressful situation related to work.  There have been no episodes of lightheadedness or syncope.  He reports no episodes of orthopnea or PND.  Medications are unchanged.

## 2023-12-15 LAB
ALBUMIN SERPL ELPH-MCNC: 5.2 G/DL
ALP BLD-CCNC: 63 U/L
ALT SERPL-CCNC: 26 U/L
AST SERPL-CCNC: 22 U/L
BILIRUB DIRECT SERPL-MCNC: 0.2 MG/DL
BILIRUB INDIRECT SERPL-MCNC: 0.5 MG/DL
BILIRUB SERPL-MCNC: 0.7 MG/DL
CHOLEST SERPL-MCNC: 228 MG/DL
CK SERPL-CCNC: 177 U/L
HDLC SERPL-MCNC: 44 MG/DL
LDLC SERPL CALC-MCNC: 139 MG/DL
LDLC SERPL DIRECT ASSAY-MCNC: 148 MG/DL
NONHDLC SERPL-MCNC: 184 MG/DL
PROT SERPL-MCNC: 7.3 G/DL
TRIGL SERPL-MCNC: 252 MG/DL

## 2023-12-15 RX ORDER — ATORVASTATIN CALCIUM 10 MG/1
10 TABLET, FILM COATED ORAL
Qty: 90 | Refills: 3 | Status: ACTIVE | COMMUNITY
Start: 2023-12-15 | End: 1900-01-01

## 2023-12-26 NOTE — PHYSICAL EXAM
[No Acute Distress] : no acute distress [Well Nourished] : well nourished [Well Developed] : well developed [Well-Appearing] : well-appearing [Normal Voice/Communication] : normal voice/communication [Normal Sclera/Conjunctiva] : normal sclera/conjunctiva [PERRL] : pupils equal round and reactive to light [Normal Outer Ear/Nose] : the outer ears and nose were normal in appearance [Normal Oropharynx] : the oropharynx was normal [Normal TMs] : both tympanic membranes were normal [No JVD] : no jugular venous distention [No Lymphadenopathy] : no lymphadenopathy [Supple] : supple [Thyroid Normal, No Nodules] : the thyroid was normal and there were no nodules present [No Respiratory Distress] : no respiratory distress  [No Accessory Muscle Use] : no accessory muscle use [Clear to Auscultation] : lungs were clear to auscultation bilaterally [Normal Rate] : normal rate  [Regular Rhythm] : with a regular rhythm [Normal S1, S2] : normal S1 and S2 [No Murmur] : no murmur heard [No Carotid Bruits] : no carotid bruits [No Abdominal Bruit] : a ~M bruit was not heard ~T in the abdomen [No Varicosities] : no varicosities [Pedal Pulses Present] : the pedal pulses are present [No Edema] : there was no peripheral edema [No Palpable Aorta] : no palpable aorta [No Extremity Clubbing/Cyanosis] : no extremity clubbing/cyanosis [Soft] : abdomen soft [Non Tender] : non-tender [Non-distended] : non-distended [No Masses] : no abdominal mass palpated [No HSM] : no HSM [Normal Bowel Sounds] : normal bowel sounds [Normal Supraclavicular Nodes] : no supraclavicular lymphadenopathy [Normal Posterior Cervical Nodes] : no posterior cervical lymphadenopathy [Normal Anterior Cervical Nodes] : no anterior cervical lymphadenopathy [No Spinal Tenderness] : no spinal tenderness [No Joint Swelling] : no joint swelling [Grossly Normal Strength/Tone] : grossly normal strength/tone [No Rash] : no rash [Coordination Grossly Intact] : coordination grossly intact [No Focal Deficits] : no focal deficits [Normal Gait] : normal gait [Normal Affect] : the affect was normal [Alert and Oriented x3] : oriented to person, place, and time [Normal Mood] : the mood was normal [Normal Insight/Judgement] : insight and judgment were intact

## 2024-02-07 ENCOUNTER — TRANSCRIPTION ENCOUNTER (OUTPATIENT)
Age: 54
End: 2024-02-07

## 2024-02-09 ENCOUNTER — TRANSCRIPTION ENCOUNTER (OUTPATIENT)
Age: 54
End: 2024-02-09

## 2024-02-19 LAB
ALBUMIN SERPL ELPH-MCNC: 5.1 G/DL
ALP BLD-CCNC: 64 U/L
ALT SERPL-CCNC: 32 U/L
AST SERPL-CCNC: 26 U/L
BILIRUB DIRECT SERPL-MCNC: 0.2 MG/DL
BILIRUB INDIRECT SERPL-MCNC: 0.6 MG/DL
BILIRUB SERPL-MCNC: 0.8 MG/DL
CHOLEST SERPL-MCNC: 138 MG/DL
CK SERPL-CCNC: 136 U/L
HDLC SERPL-MCNC: 46 MG/DL
LDLC SERPL CALC-MCNC: 71 MG/DL
LDLC SERPL DIRECT ASSAY-MCNC: 79 MG/DL
NONHDLC SERPL-MCNC: 92 MG/DL
PROT SERPL-MCNC: 7.4 G/DL
TRIGL SERPL-MCNC: 123 MG/DL

## 2024-04-19 ENCOUNTER — TRANSCRIPTION ENCOUNTER (OUTPATIENT)
Age: 54
End: 2024-04-19

## 2024-06-07 ENCOUNTER — APPOINTMENT (OUTPATIENT)
Dept: CARDIOLOGY | Facility: CLINIC | Age: 54
End: 2024-06-07
Payer: COMMERCIAL

## 2024-06-07 ENCOUNTER — NON-APPOINTMENT (OUTPATIENT)
Age: 54
End: 2024-06-07

## 2024-06-07 VITALS
RESPIRATION RATE: 15 BRPM | OXYGEN SATURATION: 96 % | SYSTOLIC BLOOD PRESSURE: 140 MMHG | BODY MASS INDEX: 27.28 KG/M2 | HEART RATE: 74 BPM | WEIGHT: 224 LBS | DIASTOLIC BLOOD PRESSURE: 80 MMHG | HEIGHT: 76 IN

## 2024-06-07 DIAGNOSIS — E78.00 PURE HYPERCHOLESTEROLEMIA, UNSPECIFIED: ICD-10-CM

## 2024-06-07 DIAGNOSIS — R00.2 PALPITATIONS: ICD-10-CM

## 2024-06-07 DIAGNOSIS — R07.89 OTHER CHEST PAIN: ICD-10-CM

## 2024-06-07 DIAGNOSIS — I10 ESSENTIAL (PRIMARY) HYPERTENSION: ICD-10-CM

## 2024-06-07 PROCEDURE — 93000 ELECTROCARDIOGRAM COMPLETE: CPT

## 2024-06-07 PROCEDURE — 99214 OFFICE O/P EST MOD 30 MIN: CPT | Mod: 25

## 2024-06-07 NOTE — PHYSICAL EXAM
[General Appearance - Well Developed] : well developed [Normal Appearance] : normal appearance [Well Groomed] : well groomed [General Appearance - Well Nourished] : well nourished [General Appearance - In No Acute Distress] : no acute distress [Normal Conjunctiva] : the conjunctiva exhibited no abnormalities [Eyelids - No Xanthelasma] : the eyelids demonstrated no xanthelasmas [Normal Jugular Venous A Waves Present] : normal jugular venous A waves present [Normal Jugular Venous V Waves Present] : normal jugular venous V waves present [No Jugular Venous Dick A Waves] : no jugular venous dick A waves [Respiration, Rhythm And Depth] : normal respiratory rhythm and effort [Auscultation Breath Sounds / Voice Sounds] : lungs were clear to auscultation bilaterally [Heart Rate And Rhythm] : heart rate and rhythm were normal [Bowel Sounds] : normal bowel sounds [Abnormal Walk] : normal gait [Gait - Sufficient For Exercise Testing] : the gait was sufficient for exercise testing [Nail Clubbing] : no clubbing of the fingernails [Cyanosis, Localized] : no localized cyanosis [] : no rash [Skin Color & Pigmentation] : normal skin color and pigmentation [No Venous Stasis] : no venous stasis [Oriented To Time, Place, And Person] : oriented to person, place, and time [Impaired Insight] : insight and judgment were intact [Affect] : the affect was normal [Mood] : the mood was normal [FreeTextEntry1] : 2+ pulses in the upper and lower extremities to the level of the feet. No edema.

## 2024-06-07 NOTE — HISTORY OF PRESENT ILLNESS
[FreeTextEntry1] : He is treated with losartan for HBP.  He was evaluated in the ED in 2008 for a racing heart and chest discomfort. A stress isotope study was done at that time, and was unremarkable.    As he returns today, he remains well overall.  He was under some stress since I saw him last, as his wife required surgery for a pituitary tumor that was starting to affect her vision; fortunately, she is doing well.  He continues his usual activities and work; he reports no exertional chest discomfort or TAFOYA.  He reports no significant palps since I saw him last.  He describes no episodes of lightheadedness or syncope & no orthopnea or PND.  Medications are unchanged.  He is tolerating Lipitor without problem.

## 2024-06-07 NOTE — DISCUSSION/SUMMARY
[EKG obtained to assist in diagnosis and management of assessed problem(s)] : EKG obtained to assist in diagnosis and management of assessed problem(s) [FreeTextEntry1] : EKG today shows:  Sinus Rhythm at 69 bpm.  Mild IVCD, otherwise unremarkable; no significant change.    PLAN: 1.  Palpitations  - EKG remains unremarkable.  Recent echo showed good cardiac function.  Has not had troublesome sxs. of late.  2. HBP - BP remains in good range today. - continue losartan 50 mg. qd.  3.  HLD - - labs in February were favorable; continue statin at current dose.  4. Atypical CP - unremarkable Ex-Echo in 7/2023; no recent recurrence of sxs.  33 minutes spent on today's office visit.    Return for an O.V. in 4 mo.

## 2024-08-15 ENCOUNTER — TRANSCRIPTION ENCOUNTER (OUTPATIENT)
Age: 54
End: 2024-08-15

## 2024-09-17 ENCOUNTER — APPOINTMENT (OUTPATIENT)
Dept: CARDIOLOGY | Facility: CLINIC | Age: 54
End: 2024-09-17

## 2024-10-18 ENCOUNTER — NON-APPOINTMENT (OUTPATIENT)
Age: 54
End: 2024-10-18

## 2024-10-18 ENCOUNTER — APPOINTMENT (OUTPATIENT)
Dept: CARDIOLOGY | Facility: CLINIC | Age: 54
End: 2024-10-18
Payer: COMMERCIAL

## 2024-10-18 VITALS
RESPIRATION RATE: 15 BRPM | WEIGHT: 224 LBS | SYSTOLIC BLOOD PRESSURE: 149 MMHG | HEART RATE: 84 BPM | DIASTOLIC BLOOD PRESSURE: 88 MMHG | OXYGEN SATURATION: 99 % | BODY MASS INDEX: 27.27 KG/M2

## 2024-10-18 DIAGNOSIS — R00.2 PALPITATIONS: ICD-10-CM

## 2024-10-18 DIAGNOSIS — I10 ESSENTIAL (PRIMARY) HYPERTENSION: ICD-10-CM

## 2024-10-18 DIAGNOSIS — E78.00 PURE HYPERCHOLESTEROLEMIA, UNSPECIFIED: ICD-10-CM

## 2024-10-18 DIAGNOSIS — R07.89 OTHER CHEST PAIN: ICD-10-CM

## 2024-10-18 PROCEDURE — 93000 ELECTROCARDIOGRAM COMPLETE: CPT

## 2024-10-18 PROCEDURE — 99214 OFFICE O/P EST MOD 30 MIN: CPT | Mod: 25

## 2024-10-18 RX ORDER — SILDENAFIL 50 MG/1
50 TABLET ORAL
Qty: 6 | Refills: 5 | Status: ACTIVE | COMMUNITY
Start: 2024-10-18 | End: 1900-01-01

## 2024-12-09 ENCOUNTER — APPOINTMENT (OUTPATIENT)
Dept: INTERNAL MEDICINE | Facility: CLINIC | Age: 54
End: 2024-12-09
Payer: COMMERCIAL

## 2024-12-09 ENCOUNTER — RESULT REVIEW (OUTPATIENT)
Age: 54
End: 2024-12-09

## 2024-12-09 VITALS
DIASTOLIC BLOOD PRESSURE: 90 MMHG | WEIGHT: 223 LBS | SYSTOLIC BLOOD PRESSURE: 140 MMHG | BODY MASS INDEX: 27.16 KG/M2 | HEART RATE: 88 BPM | OXYGEN SATURATION: 97 % | HEIGHT: 76 IN | TEMPERATURE: 208.22 F

## 2024-12-09 DIAGNOSIS — R10.31 RIGHT LOWER QUADRANT PAIN: ICD-10-CM

## 2024-12-09 PROCEDURE — 99213 OFFICE O/P EST LOW 20 MIN: CPT

## 2024-12-13 ENCOUNTER — APPOINTMENT (OUTPATIENT)
Dept: ULTRASOUND IMAGING | Facility: CLINIC | Age: 54
End: 2024-12-13
Payer: COMMERCIAL

## 2024-12-13 PROCEDURE — 76857 US EXAM PELVIC LIMITED: CPT

## 2025-01-03 LAB
ALBUMIN SERPL ELPH-MCNC: 4.9 G/DL
ALP BLD-CCNC: 60 U/L
ALT SERPL-CCNC: 27 U/L
ANION GAP SERPL CALC-SCNC: 17 MMOL/L
AST SERPL-CCNC: 20 U/L
BILIRUB SERPL-MCNC: 0.8 MG/DL
BUN SERPL-MCNC: 14 MG/DL
CALCIUM SERPL-MCNC: 9.6 MG/DL
CHLORIDE SERPL-SCNC: 103 MMOL/L
CHOLEST SERPL-MCNC: 138 MG/DL
CO2 SERPL-SCNC: 25 MMOL/L
CREAT SERPL-MCNC: 0.77 MG/DL
EGFR: 105 ML/MIN/1.73M2
ESTIMATED AVERAGE GLUCOSE: 108 MG/DL
GLUCOSE SERPL-MCNC: 95 MG/DL
HBA1C MFR BLD HPLC: 5.4 %
HCT VFR BLD CALC: 44.5 %
HDLC SERPL-MCNC: 44 MG/DL
HGB BLD-MCNC: 15.6 G/DL
LDLC SERPL CALC-MCNC: 65 MG/DL
MCHC RBC-ENTMCNC: 31.3 PG
MCHC RBC-ENTMCNC: 35.1 G/DL
MCV RBC AUTO: 89.2 FL
NONHDLC SERPL-MCNC: 95 MG/DL
PLATELET # BLD AUTO: 169 K/UL
POTASSIUM SERPL-SCNC: 4.2 MMOL/L
PROT SERPL-MCNC: 6.7 G/DL
PSA FREE FLD-MCNC: 35 %
PSA FREE SERPL-MCNC: 0.33 NG/ML
PSA SERPL-MCNC: 0.92 NG/ML
RBC # BLD: 4.99 M/UL
RBC # FLD: 12.2 %
SODIUM SERPL-SCNC: 144 MMOL/L
TRIGL SERPL-MCNC: 177 MG/DL
TSH SERPL-ACNC: 5.15 UIU/ML
WBC # FLD AUTO: 5.47 K/UL

## 2025-01-27 ENCOUNTER — TRANSCRIPTION ENCOUNTER (OUTPATIENT)
Age: 55
End: 2025-01-27

## 2025-01-31 ENCOUNTER — APPOINTMENT (OUTPATIENT)
Dept: INTERNAL MEDICINE | Facility: CLINIC | Age: 55
End: 2025-01-31
Payer: COMMERCIAL

## 2025-01-31 VITALS
BODY MASS INDEX: 27.64 KG/M2 | WEIGHT: 227 LBS | SYSTOLIC BLOOD PRESSURE: 148 MMHG | TEMPERATURE: 208.04 F | DIASTOLIC BLOOD PRESSURE: 92 MMHG | HEART RATE: 90 BPM | HEIGHT: 76 IN | OXYGEN SATURATION: 97 %

## 2025-01-31 DIAGNOSIS — Z12.11 ENCOUNTER FOR SCREENING FOR MALIGNANT NEOPLASM OF COLON: ICD-10-CM

## 2025-01-31 DIAGNOSIS — E78.00 PURE HYPERCHOLESTEROLEMIA, UNSPECIFIED: ICD-10-CM

## 2025-01-31 DIAGNOSIS — K40.90 UNILATERAL INGUINAL HERNIA, W/OUT OBSTRUCTION OR GANGRENE, NOT SPECIFIED AS RECURRENT: ICD-10-CM

## 2025-01-31 DIAGNOSIS — I10 ESSENTIAL (PRIMARY) HYPERTENSION: ICD-10-CM

## 2025-01-31 DIAGNOSIS — Z00.00 ENCOUNTER FOR GENERAL ADULT MEDICAL EXAMINATION W/OUT ABNORMAL FINDINGS: ICD-10-CM

## 2025-01-31 PROCEDURE — G0444 DEPRESSION SCREEN ANNUAL: CPT | Mod: 59

## 2025-01-31 PROCEDURE — 99396 PREV VISIT EST AGE 40-64: CPT

## 2025-03-18 ENCOUNTER — APPOINTMENT (OUTPATIENT)
Dept: INTERNAL MEDICINE | Facility: CLINIC | Age: 55
End: 2025-03-18

## 2025-03-18 VITALS
DIASTOLIC BLOOD PRESSURE: 92 MMHG | TEMPERATURE: 208.94 F | OXYGEN SATURATION: 98 % | HEART RATE: 91 BPM | WEIGHT: 222 LBS | BODY MASS INDEX: 27.03 KG/M2 | SYSTOLIC BLOOD PRESSURE: 138 MMHG | HEIGHT: 76 IN

## 2025-03-18 DIAGNOSIS — R05.8 OTHER SPECIFIED COUGH: ICD-10-CM

## 2025-03-18 DIAGNOSIS — R06.2 WHEEZING: ICD-10-CM

## 2025-03-18 PROCEDURE — 99214 OFFICE O/P EST MOD 30 MIN: CPT

## 2025-03-18 RX ORDER — BENZONATATE 100 MG/1
100 CAPSULE ORAL 3 TIMES DAILY
Qty: 20 | Refills: 0 | Status: ACTIVE | COMMUNITY
Start: 2025-03-18 | End: 1900-01-01

## 2025-03-18 RX ORDER — AZITHROMYCIN 250 MG/1
250 TABLET, FILM COATED ORAL
Qty: 1 | Refills: 0 | Status: ACTIVE | COMMUNITY
Start: 2025-03-18 | End: 1900-01-01

## 2025-03-18 RX ORDER — ALBUTEROL SULFATE 90 UG/1
108 (90 BASE) INHALANT RESPIRATORY (INHALATION)
Qty: 1 | Refills: 0 | Status: ACTIVE | COMMUNITY
Start: 2025-03-18 | End: 1900-01-01

## 2025-03-19 LAB
INFLUENZA A RESULT: NOT DETECTED
INFLUENZA B RESULT: NOT DETECTED
RESP SYN VIRUS RESULT: NOT DETECTED
SARS-COV-2 RESULT: NOT DETECTED

## 2025-04-04 ENCOUNTER — APPOINTMENT (OUTPATIENT)
Dept: CARDIOLOGY | Facility: CLINIC | Age: 55
End: 2025-04-04

## 2025-07-15 ENCOUNTER — APPOINTMENT (OUTPATIENT)
Dept: INTERNAL MEDICINE | Facility: CLINIC | Age: 55
End: 2025-07-15

## 2025-07-15 VITALS
DIASTOLIC BLOOD PRESSURE: 70 MMHG | BODY MASS INDEX: 28.25 KG/M2 | OXYGEN SATURATION: 96 % | HEART RATE: 103 BPM | HEIGHT: 76 IN | SYSTOLIC BLOOD PRESSURE: 128 MMHG | WEIGHT: 232 LBS | TEMPERATURE: 208.22 F

## 2025-07-15 PROBLEM — R09.82 POST-NASAL DRIP: Status: ACTIVE | Noted: 2025-07-15

## 2025-07-15 PROCEDURE — 99215 OFFICE O/P EST HI 40 MIN: CPT

## 2025-07-15 RX ORDER — CETIRIZINE HYDROCHLORIDE 10 MG/1
10 TABLET, COATED ORAL
Qty: 90 | Refills: 1 | Status: ACTIVE | COMMUNITY
Start: 2025-07-15 | End: 1900-01-01

## 2025-07-18 ENCOUNTER — TRANSCRIPTION ENCOUNTER (OUTPATIENT)
Age: 55
End: 2025-07-18

## 2025-07-21 ENCOUNTER — APPOINTMENT (OUTPATIENT)
Dept: CARDIOLOGY | Facility: CLINIC | Age: 55
End: 2025-07-21
Payer: COMMERCIAL

## 2025-07-21 ENCOUNTER — NON-APPOINTMENT (OUTPATIENT)
Age: 55
End: 2025-07-21

## 2025-07-21 VITALS
SYSTOLIC BLOOD PRESSURE: 140 MMHG | RESPIRATION RATE: 15 BRPM | BODY MASS INDEX: 27.75 KG/M2 | HEART RATE: 76 BPM | WEIGHT: 228 LBS | OXYGEN SATURATION: 99 % | DIASTOLIC BLOOD PRESSURE: 80 MMHG

## 2025-07-21 DIAGNOSIS — R00.2 PALPITATIONS: ICD-10-CM

## 2025-07-21 LAB
ANION GAP SERPL CALC-SCNC: 15 MMOL/L
BUN SERPL-MCNC: 12 MG/DL
CALCIUM SERPL-MCNC: 10 MG/DL
CHLORIDE SERPL-SCNC: 104 MMOL/L
CO2 SERPL-SCNC: 23 MMOL/L
CREAT SERPL-MCNC: 0.84 MG/DL
EGFRCR SERPLBLD CKD-EPI 2021: 102 ML/MIN/1.73M2
POTASSIUM SERPL-SCNC: 4.5 MMOL/L
SODIUM SERPL-SCNC: 142 MMOL/L
TSH SERPL-ACNC: 3.41 UIU/ML

## 2025-07-21 PROCEDURE — 93000 ELECTROCARDIOGRAM COMPLETE: CPT

## 2025-07-21 PROCEDURE — 99215 OFFICE O/P EST HI 40 MIN: CPT | Mod: 25

## 2025-07-25 ENCOUNTER — TRANSCRIPTION ENCOUNTER (OUTPATIENT)
Age: 55
End: 2025-07-25

## 2025-07-25 RX ORDER — FLUTICASONE PROPIONATE 50 UG/1
50 SPRAY NASAL DAILY
Qty: 1 | Refills: 2 | Status: ACTIVE | COMMUNITY
Start: 2025-07-15 | End: 1900-01-01

## 2025-07-30 ENCOUNTER — APPOINTMENT (OUTPATIENT)
Dept: INTERNAL MEDICINE | Facility: CLINIC | Age: 55
End: 2025-07-30
Payer: COMMERCIAL

## 2025-07-30 VITALS
WEIGHT: 224.5 LBS | OXYGEN SATURATION: 96 % | HEIGHT: 76 IN | SYSTOLIC BLOOD PRESSURE: 124 MMHG | HEART RATE: 86 BPM | DIASTOLIC BLOOD PRESSURE: 74 MMHG | TEMPERATURE: 207.5 F | BODY MASS INDEX: 27.34 KG/M2 | RESPIRATION RATE: 15 BRPM

## 2025-07-30 DIAGNOSIS — R42 DIZZINESS AND GIDDINESS: ICD-10-CM

## 2025-07-30 DIAGNOSIS — I10 ESSENTIAL (PRIMARY) HYPERTENSION: ICD-10-CM

## 2025-07-30 DIAGNOSIS — E78.00 PURE HYPERCHOLESTEROLEMIA, UNSPECIFIED: ICD-10-CM

## 2025-07-30 PROCEDURE — 99214 OFFICE O/P EST MOD 30 MIN: CPT

## 2025-08-01 ENCOUNTER — TRANSCRIPTION ENCOUNTER (OUTPATIENT)
Age: 55
End: 2025-08-01

## 2025-08-08 ENCOUNTER — TRANSCRIPTION ENCOUNTER (OUTPATIENT)
Age: 55
End: 2025-08-08

## 2025-08-11 ENCOUNTER — TRANSCRIPTION ENCOUNTER (OUTPATIENT)
Age: 55
End: 2025-08-11

## 2025-08-25 ENCOUNTER — APPOINTMENT (OUTPATIENT)
Dept: CARDIOLOGY | Facility: CLINIC | Age: 55
End: 2025-08-25

## 2025-09-05 ENCOUNTER — APPOINTMENT (OUTPATIENT)
Dept: OTOLARYNGOLOGY | Facility: CLINIC | Age: 55
End: 2025-09-05